# Patient Record
Sex: MALE | Race: BLACK OR AFRICAN AMERICAN | NOT HISPANIC OR LATINO | Employment: UNEMPLOYED | URBAN - METROPOLITAN AREA
[De-identification: names, ages, dates, MRNs, and addresses within clinical notes are randomized per-mention and may not be internally consistent; named-entity substitution may affect disease eponyms.]

---

## 2021-02-23 ENCOUNTER — APPOINTMENT (OUTPATIENT)
Dept: RADIOLOGY | Facility: IMAGING CENTER | Age: 46
End: 2021-02-23
Attending: PHYSICIAN ASSISTANT
Payer: MEDICAID

## 2021-02-23 ENCOUNTER — OFFICE VISIT (OUTPATIENT)
Dept: URGENT CARE | Facility: CLINIC | Age: 46
End: 2021-02-23
Payer: MEDICAID

## 2021-02-23 VITALS
RESPIRATION RATE: 12 BRPM | WEIGHT: 163 LBS | SYSTOLIC BLOOD PRESSURE: 116 MMHG | BODY MASS INDEX: 24.71 KG/M2 | DIASTOLIC BLOOD PRESSURE: 88 MMHG | HEART RATE: 107 BPM | HEIGHT: 68 IN | OXYGEN SATURATION: 96 % | TEMPERATURE: 98.2 F

## 2021-02-23 DIAGNOSIS — S89.92XA INJURY OF LEFT KNEE, INITIAL ENCOUNTER: ICD-10-CM

## 2021-02-23 PROCEDURE — 99214 OFFICE O/P EST MOD 30 MIN: CPT | Performed by: PHYSICIAN ASSISTANT

## 2021-02-23 PROCEDURE — 73564 X-RAY EXAM KNEE 4 OR MORE: CPT | Mod: TC,LT | Performed by: PHYSICIAN ASSISTANT

## 2021-02-23 ASSESSMENT — ENCOUNTER SYMPTOMS
TREMORS: 0
CLAUDICATION: 0
PALPITATIONS: 0
NAUSEA: 0
FOCAL WEAKNESS: 0
FEVER: 0
COUGH: 0
ABDOMINAL PAIN: 0
DIZZINESS: 0
VOMITING: 0
TINGLING: 0
WEAKNESS: 0
ORTHOPNEA: 0
HEADACHES: 0
SPEECH CHANGE: 0
SEIZURES: 0
CHILLS: 0
SHORTNESS OF BREATH: 0
BLURRED VISION: 0
SENSORY CHANGE: 0
DIARRHEA: 0
LOSS OF CONSCIOUSNESS: 0
DOUBLE VISION: 0

## 2021-02-23 NOTE — PATIENT INSTRUCTIONS
Knee Sprain, Adult    A knee sprain is a stretch or tear in a knee ligament. Knee ligaments are bands of tissue that connect bones in the knee to each other.  What are the causes?  This condition often results from:  · A fall.  · An injury to the knee.  What are the signs or symptoms?  Symptoms of this condition include:  · Trouble bending the leg.  · Swelling in the knee.  · Bruising around the knee.  · Tenderness or pain in the knee.  · Muscle spasms around the knee.  How is this diagnosed?  This condition may be diagnosed based on:  · A physical exam.  · What happened just before you started to have symptoms.  · Tests, including:  ? An X-ray. This may be done to make sure no bones are broken.  ? An MRI. This may be done to check if the ligament is torn.  ? Stress testing of the knee. This may be done to check ligament damage.  How is this treated?  Treatment for this condition may involve:  · Keeping the knee still (immobilized) with a cast, brace, or splint.  · Applying ice to the knee. This helps with pain and swelling.  · Keeping the knee raised (elevated) above the level of your heart when you are resting. This helps with pain and swelling.  · Taking medicine for pain.  · Exercises to prevent or limit permanent weakness or stiffness in your knee.  · Surgery to reconnect the ligament to the bone or to reconstruct it. This may be needed if the ligament tore all the way.  Follow these instructions at home:  If you have a splint or brace:  · Wear the splint or brace as told by your health care provider. Remove it only as told by your health care provider.  · Loosen the splint or brace if your toes tingle, become numb, or turn cold and blue.  · Keep the splint or brace clean.  · If the splint or brace is not waterproof:  ? Do not let it get wet.  ? Cover it with a watertight covering when you take a bath or a shower.  If you have a cast:  · Do not stick anything inside the cast to scratch your skin. Doing that  increases your risk of infection.  · Check the skin around the cast every day. Tell your health care provider about any concerns.  · You may put lotion on dry skin around the edges of the cast. Do not put lotion on the skin underneath the cast.  · Keep the cast clean.  · If the cast is not waterproof:  ? Do not let it get wet.  ? Cover it with a watertight covering when you take a bath or a shower.  Managing pain, stiffness, and swelling    · If directed, put ice on the injured area.  ? If you have a removable splint or brace, remove it as told by your health care provider.  ? Put ice in a plastic bag.  ? Place a towel between your skin and the bag or between your cast and the bag.  ? Leave the ice on for 20 minutes, 2-3 times a day.  · Gently move your toes often to avoid stiffness and to lessen swelling.  · Elevate the injured area above the level of your heart while you are sitting or lying down.  · Take over-the-counter and prescription medicines only as told by your health care provider.  General instructions  · Do exercises as told by your health care provider.  · Keep all follow-up visits as told by your health care provider. This is important.  Contact a health care provider if:  · You have pain that gets worse.  · The cast, brace, or splint does not fit right.  · The cast, brace, or splint gets damaged.  Get help right away if:  · You cannot use your injured joint to support any of your body weight (cannot bear weight).  · You cannot move the injured joint.  · You cannot walk more than a few steps without pain or without your knee buckling.  · You have significant pain, swelling, or numbness below the cast, brace, or splint.  This information is not intended to replace advice given to you by your health care provider. Make sure you discuss any questions you have with your health care provider.  Document Released: 12/18/2006 Document Revised: 04/10/2020 Document Reviewed: 07/07/2017  [facility]  Education © 2020 [employer]

## 2021-02-23 NOTE — PROGRESS NOTES
"Subjective:   Shaheen Kay is a 45 y.o. male who presents for Knee Injury ((Left) Knee Injury from trying to pick something up 3 days ago)      Knee Injury  This is a new problem. The current episode started in the past 7 days (twisted left knee going up the stairs). The problem occurs constantly. The problem has been unchanged. Pertinent negatives include no abdominal pain, chest pain, chills, coughing, fever, headaches, nausea, rash, vomiting or weakness. Associated symptoms comments: Left knee pain  . The symptoms are aggravated by walking. He has tried nothing for the symptoms.       Review of Systems   Constitutional: Negative for chills and fever.   Eyes: Negative for blurred vision and double vision.   Respiratory: Negative for cough and shortness of breath.    Cardiovascular: Negative for chest pain, palpitations, orthopnea, claudication and leg swelling.   Gastrointestinal: Negative for abdominal pain, diarrhea, nausea and vomiting.   Musculoskeletal:        Left knee pain   Skin: Negative for rash.   Neurological: Negative for dizziness, tingling, tremors, sensory change, speech change, focal weakness, seizures, loss of consciousness, weakness and headaches.   All other systems reviewed and are negative.      Medications:    • This patient does not have an active medication from one of the medication groupers.    Allergies: Patient has no known allergies.    Problem List: Shaheen Kay does not have a problem list on file.    Surgical History:  No past surgical history on file.    Past Social Hx: Shaheen Kay       Past Family Hx:  Shaheen Kay family history is not on file.     Problem list, medications, and allergies reviewed by myself today in Epic.     Objective:     Blood Pressure 116/88 (BP Location: Left arm, Patient Position: Sitting, BP Cuff Size: Adult)   Pulse (Abnormal) 107   Temperature 36.8 °C (98.2 °F) (Temporal)   Respiration 12   Height 1.727 m (5' 8\")   Weight 73.9 kg " (163 lb)   Oxygen Saturation 96%   Body Mass Index 24.78 kg/m²     Physical Exam  Vitals reviewed.   Constitutional:       Appearance: He is well-developed.   Cardiovascular:      Rate and Rhythm: Normal rate and regular rhythm.      Pulses: Normal pulses.           Popliteal pulses are 2+ on the right side and 2+ on the left side.        Dorsalis pedis pulses are 2+ on the right side and 2+ on the left side.      Heart sounds: Normal heart sounds.   Pulmonary:      Effort: Pulmonary effort is normal.      Breath sounds: Normal breath sounds.   Musculoskeletal:         General: Tenderness present. No swelling or deformity.      Cervical back: Normal range of motion and neck supple.      Comments: PTP of the distal and proximal left knee.  Limited ROM with active movement.  No pain with passive movement.  Pt not able to squat.  No palpable effusion/bursa.  Patella is midline. Extremity compartments soft.      Special Tests:   Varus/Valgus: NEG  Lachman: NEG  Posterior Drawer: NEG   Mo: NEG   Skin:     General: Skin is warm and dry.      Capillary Refill: Capillary refill takes less than 2 seconds.      Findings: No erythema.   Neurological:      General: No focal deficit present.      Mental Status: He is alert and oriented to person, place, and time. Mental status is at baseline.      Sensory: No sensory deficit.      Gait: Gait abnormal.      Comments: Antalgic gait present.   Psychiatric:         Mood and Affect: Mood normal.         Behavior: Behavior normal.         Thought Content: Thought content normal.         Judgment: Judgment normal.       RADIOLOGY RESULTS   DX-KNEE COMPLETE 4+ LEFT    Result Date: 2/23/2021 2/23/2021 3:21 PM HISTORY/REASON FOR EXAM:  Pain/Deformity Following Trauma Left anterior knee pain TECHNIQUE/EXAM DESCRIPTION AND NUMBER OF VIEWS:  4 views of the LEFT knee. COMPARISON: None FINDINGS: No acute fracture or dislocation. No joint osteoarthritis Mild to moderate knee joint  effusion     1. No acute osseous abnormality. 2. Mild to moderate knee joint effusion.             Assessment/Plan:     Medical Decision Making/Comments     Pt is a 45 yr old male who presents for evaluation of left knee pain.  Patient states he was carrying something up the stairs and twisted his knee.  Pt denies previous injury/surgery to the affected area.  Exam shows PTP of the proximal/distal portions of the anterior knee with no PTP of the joint above or below.  There is no significant swelling, bruising or erythema over the joint.  There is decreased ROM and muscle strength compared to the unaffected limb.  Distal neurovascular status is intact with soft compartments.  Pt able to walk with an antalgic gait.  Special tests are negative.  X-rays are negative for fracture or degenerative changes by mine and radiologist read.  No red flag findings on exam.    Diagnosis differential includes but not limited to:    Acute with a mechanism of injury:  Knee strain, ligamental/tendon/muscle tear, bursitis, contusion, fractures.       Diagnosis and associated orders     1. Injury of left knee, initial encounter  DX-KNEE COMPLETE 4+ LEFT    REFERRAL TO ORTHOPEDICS   -Protection/compression  -Rest: activity as tolerated  -Ice: Ice first 3-7 days.  20 min off/on  -Elevation  -NSAIDs/Acetomenophen as needed               Differential diagnosis, natural history, supportive care, and indications for immediate follow-up discussed.    Advised the patient to follow-up with the primary care physician for recheck, reevaluation, and consideration of further management.    Please note that this dictation was created using voice recognition software. I have made a reasonable attempt to correct obvious errors, but I expect that there are errors of grammar and possibly content that I did not discover before finalizing the note.

## 2021-06-03 ENCOUNTER — HOSPITAL ENCOUNTER (OUTPATIENT)
Facility: MEDICAL CENTER | Age: 46
End: 2021-06-03
Attending: PHYSICIAN ASSISTANT
Payer: MEDICAID

## 2021-06-03 ENCOUNTER — OFFICE VISIT (OUTPATIENT)
Dept: URGENT CARE | Facility: CLINIC | Age: 46
End: 2021-06-03
Payer: MEDICAID

## 2021-06-03 VITALS
HEIGHT: 67 IN | WEIGHT: 165 LBS | HEART RATE: 78 BPM | OXYGEN SATURATION: 96 % | DIASTOLIC BLOOD PRESSURE: 86 MMHG | SYSTOLIC BLOOD PRESSURE: 132 MMHG | RESPIRATION RATE: 14 BRPM | BODY MASS INDEX: 25.9 KG/M2 | TEMPERATURE: 98.7 F

## 2021-06-03 DIAGNOSIS — J34.89 SINUS PAIN: ICD-10-CM

## 2021-06-03 DIAGNOSIS — R51.9 NONINTRACTABLE HEADACHE, UNSPECIFIED CHRONICITY PATTERN, UNSPECIFIED HEADACHE TYPE: ICD-10-CM

## 2021-06-03 LAB
COVID ORDER STATUS COVID19: NORMAL
SARS-COV-2 RNA RESP QL NAA+PROBE: DETECTED
SPECIMEN SOURCE: ABNORMAL

## 2021-06-03 PROCEDURE — U0005 INFEC AGEN DETEC AMPLI PROBE: HCPCS

## 2021-06-03 PROCEDURE — 99213 OFFICE O/P EST LOW 20 MIN: CPT | Performed by: PHYSICIAN ASSISTANT

## 2021-06-03 PROCEDURE — U0003 INFECTIOUS AGENT DETECTION BY NUCLEIC ACID (DNA OR RNA); SEVERE ACUTE RESPIRATORY SYNDROME CORONAVIRUS 2 (SARS-COV-2) (CORONAVIRUS DISEASE [COVID-19]), AMPLIFIED PROBE TECHNIQUE, MAKING USE OF HIGH THROUGHPUT TECHNOLOGIES AS DESCRIBED BY CMS-2020-01-R: HCPCS

## 2021-06-03 RX ORDER — CETIRIZINE HYDROCHLORIDE 10 MG/1
10 TABLET ORAL DAILY
Qty: 14 TABLET | Refills: 0 | Status: SHIPPED | OUTPATIENT
Start: 2021-06-03 | End: 2021-06-17

## 2021-06-03 RX ORDER — FLUTICASONE PROPIONATE 50 MCG
1 SPRAY, SUSPENSION (ML) NASAL DAILY
Qty: 16 G | Refills: 0 | Status: SHIPPED | OUTPATIENT
Start: 2021-06-03

## 2021-06-03 ASSESSMENT — ENCOUNTER SYMPTOMS
EYE REDNESS: 0
SINUS PRESSURE: 1
COUGH: 1
HEADACHES: 1
CHILLS: 0
SINUS PAIN: 1
EYE DISCHARGE: 0
FEVER: 0

## 2021-06-03 NOTE — PROGRESS NOTES
"Subjective:      Shaheen Kay is a 45 y.o. male who presents with Sinus Problem (x 3 days ) and Headache            Sinus Problem  This is a new problem. Episode onset: 3 days ago. There has been no fever. Associated symptoms include congestion, coughing, headaches and sinus pressure. Pertinent negatives include no chills. Past treatments include nothing.   Headache   Associated symptoms include coughing and sinus pressure. Pertinent negatives include no eye redness or fever.   Patient does report history of sinus pain and headaches in particular during allergy season.  Patient does have history of such during this time of year.  He has not taken anything for his symptoms.  Of note patient is requesting work note to return today as he has been out of work secondary to symptoms.  Furthermore patient denies history of vaccination nor exposure to COVID-19 that he is aware of.  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    Review of Systems   Constitutional: Positive for malaise/fatigue. Negative for chills and fever.   HENT: Positive for congestion, sinus pressure and sinus pain.    Eyes: Negative for discharge and redness.   Respiratory: Positive for cough.    Skin: Negative for rash.   Neurological: Positive for headaches.   All other systems reviewed and are negative.         Objective:     /86   Pulse 78   Temp 37.1 °C (98.7 °F)   Resp 14   Ht 1.702 m (5' 7\")   Wt 74.8 kg (165 lb)   SpO2 96%   BMI 25.84 kg/m²    PMH:  has no past medical history on file.  MEDS: Reviewed .   ALLERGIES: No Known Allergies  SURGHX: No past surgical history on file.  SOCHX:    FH: Family history was reviewed, no pertinent findings to report    Physical Exam  Vitals reviewed.   Constitutional:       General: He is not in acute distress.     Appearance: He is well-developed.   HENT:      Head: Normocephalic and atraumatic.      Right Ear: External ear normal.      Left Ear: External ear " normal.      Nose: Congestion present.      Mouth/Throat:      Pharynx: No oropharyngeal exudate.   Eyes:      Conjunctiva/sclera: Conjunctivae normal.      Pupils: Pupils are equal, round, and reactive to light.   Neck:      Trachea: No tracheal deviation.   Cardiovascular:      Rate and Rhythm: Normal rate and regular rhythm.      Heart sounds: No murmur heard.     Pulmonary:      Effort: Pulmonary effort is normal. No respiratory distress.      Breath sounds: Normal breath sounds.   Musculoskeletal:         General: Normal range of motion.      Cervical back: Normal range of motion and neck supple.   Skin:     General: Skin is warm.      Findings: No rash.   Neurological:      Mental Status: He is alert and oriented to person, place, and time.      Coordination: Coordination normal.   Psychiatric:         Behavior: Behavior normal.         Thought Content: Thought content normal.         Judgment: Judgment normal.                        Assessment/Plan:        1. Sinus pain  - fluticasone (FLONASE ALLERGY RELIEF) 50 MCG/ACT nasal spray; Administer 1 Spray into affected nostril(S) every day.  Dispense: 16 g; Refill: 0  - cetirizine (ZYRTEC) 10 MG Tab; Take 1 tablet by mouth every day for 14 days.  Dispense: 14 tablet; Refill: 0  - COVID/SARS CoV-2 PCR; Future    2. Nonintractable headache, unspecified chronicity pattern, unspecified headache type  - fluticasone (FLONASE ALLERGY RELIEF) 50 MCG/ACT nasal spray; Administer 1 Spray into affected nostril(S) every day.  Dispense: 16 g; Refill: 0  - cetirizine (ZYRTEC) 10 MG Tab; Take 1 tablet by mouth every day for 14 days.  Dispense: 14 tablet; Refill: 0  - COVID/SARS CoV-2 PCR; Future    Symptoms most likely related to allergies however due to symptoms and need to return to work-Covid testing was sent today.  Appropriate PPE worn at all times by provider.   Pt. Had face mask on throughout entirety of the visit other than oropharyngeal examination today.     Testing  performed for COVID-19.    Patient currently without indication of need for higher level of care.       esults will also be released to patient/guardian in MyChart or called to the patient/guardian directly.  Encouraged mask use, frequent handwashing, wiping down hard surfaces, etc.    Patient and/or guardian given precautionary s/sx that mandate immediate follow up and evaluation in the ED. Advised of risks of not doing so.  Side effects of OTC or prescribed medications discussed.   DDX, Supportive care, and indications for immediate follow-up discussed with patient.    Instructed to return to clinic or nearest emergency department if we are not available for any change in condition, further concerns, or worsening of symptoms.    The patient and/or guardian demonstrated a good understanding and agreed with the treatment plan.    Please note that this dictation was created using voice recognition software. I have made every reasonable attempt to correct obvious errors, but I expect that there are errors of grammar and possibly content that I did not discover before finalizing the note.

## 2021-06-06 NOTE — RESULT ENCOUNTER NOTE
A letter has been mailed to the home address we have on file, notifying the patient he resulted positive for Covid.